# Patient Record
Sex: MALE | Race: BLACK OR AFRICAN AMERICAN | NOT HISPANIC OR LATINO
[De-identification: names, ages, dates, MRNs, and addresses within clinical notes are randomized per-mention and may not be internally consistent; named-entity substitution may affect disease eponyms.]

---

## 2020-08-20 PROBLEM — Z00.00 ENCOUNTER FOR PREVENTIVE HEALTH EXAMINATION: Status: ACTIVE | Noted: 2020-08-20

## 2020-08-25 ENCOUNTER — APPOINTMENT (OUTPATIENT)
Dept: NEUROLOGY | Facility: CLINIC | Age: 65
End: 2020-08-25

## 2020-09-02 ENCOUNTER — RESULT CHARGE (OUTPATIENT)
Age: 65
End: 2020-09-02

## 2020-09-03 ENCOUNTER — APPOINTMENT (OUTPATIENT)
Dept: NEUROLOGY | Facility: CLINIC | Age: 65
End: 2020-09-03
Payer: MEDICARE

## 2020-09-03 VITALS
HEART RATE: 83 BPM | WEIGHT: 201 LBS | BODY MASS INDEX: 28.14 KG/M2 | SYSTOLIC BLOOD PRESSURE: 112 MMHG | DIASTOLIC BLOOD PRESSURE: 74 MMHG | HEIGHT: 71 IN | OXYGEN SATURATION: 96 % | TEMPERATURE: 97.3 F

## 2020-09-03 DIAGNOSIS — R47.1 DYSARTHRIA AND ANARTHRIA: ICD-10-CM

## 2020-09-03 DIAGNOSIS — F17.200 NICOTINE DEPENDENCE, UNSPECIFIED, UNCOMPLICATED: ICD-10-CM

## 2020-09-03 DIAGNOSIS — Z86.79 PERSONAL HISTORY OF OTHER DISEASES OF THE CIRCULATORY SYSTEM: ICD-10-CM

## 2020-09-03 DIAGNOSIS — R26.81 UNSTEADINESS ON FEET: ICD-10-CM

## 2020-09-03 DIAGNOSIS — Z63.5 DISRUPTION OF FAMILY BY SEPARATION AND DIVORCE: ICD-10-CM

## 2020-09-03 DIAGNOSIS — Z78.9 OTHER SPECIFIED HEALTH STATUS: ICD-10-CM

## 2020-09-03 DIAGNOSIS — I63.9 CEREBRAL INFARCTION, UNSPECIFIED: ICD-10-CM

## 2020-09-03 DIAGNOSIS — Z87.39 PERSONAL HISTORY OF OTHER DISEASES OF THE MUSCULOSKELETAL SYSTEM AND CONNECTIVE TISSUE: ICD-10-CM

## 2020-09-03 DIAGNOSIS — Z86.39 PERSONAL HISTORY OF OTHER ENDOCRINE, NUTRITIONAL AND METABOLIC DISEASE: ICD-10-CM

## 2020-09-03 PROCEDURE — 99205 OFFICE O/P NEW HI 60 MIN: CPT

## 2020-09-03 RX ORDER — ATORVASTATIN CALCIUM 10 MG/1
10 TABLET, FILM COATED ORAL
Refills: 0 | Status: ACTIVE | COMMUNITY

## 2020-09-03 RX ORDER — ASPIRIN 81 MG/1
81 TABLET, COATED ORAL DAILY
Qty: 90 | Refills: 1 | Status: ACTIVE | COMMUNITY
Start: 2020-09-03 | End: 1900-01-01

## 2020-09-03 RX ORDER — LOSARTAN POTASSIUM 25 MG/1
25 TABLET, FILM COATED ORAL DAILY
Refills: 0 | Status: ACTIVE | COMMUNITY

## 2020-09-03 SDOH — SOCIAL STABILITY - SOCIAL INSECURITY: DISRUPTION OF FAMILY BY SEPARATION AND DIVORCE: Z63.5

## 2020-09-03 NOTE — REASON FOR VISIT
[Initial Eval - Existing Diagnosis] : an initial evaluation of an existing diagnosis [FreeTextEntry1] : slurred speech for 3 months as referred by his PCP Anastasiia Lopez

## 2020-09-03 NOTE — CONSULT LETTER
[Dear  ___] : Dear ~KISHORE, [Courtesy Letter:] : I had the pleasure of seeing your patient, [unfilled], in my office today. [Consult Closing:] : Thank you very much for allowing me to participate in the care of this patient.  If you have any questions, please do not hesitate to contact me. [Please see my note below.] : Please see my note below. [Sincerely,] : Sincerely, [FreeTextEntry3] : Dr. Taryn Ness MD\par Department of Neurology\par [facility] \par

## 2020-09-03 NOTE — PHYSICAL EXAM
[FreeTextEntry1] : Constitutional: alert, in no acute distress, well nourished and well developed.\par Psychiatric:  and insight and judgment were intact.\par \par Neurologic: \par Mental Status: The patient is alert, attentive, and oriented to person, place, and time. Dysarthria- speech\par Cranial nerves:\par CN II: Visual fields are full to confrontation. Visual acuity is intact. \par CN III, IV, VI: EOMI, PERRLA  ++nystagmus horizontally \par CN V: Facial sensation is intact to pinprick in all 3 divisions bilaterally. Corneal responses are intact.\par CN VII: Face is symmetric with normal eye closure and smile.\par CN VIII: Hearing is normal to rubbing fingers\par CN IX, X: Palate elevates symmetrically. Phonation is normal.\par CN XI: Head turning and shoulder shrug are intact and symmetric.\par CN XII: Tongue is midline with normal movements and no atrophy and no deviation with protrusion.\par Motor: There is no pronator drift of out-stretched arms. Muscle bulk and tone is normal. Strength is full bilaterally 5/5 on both UE and both LE. \par Sensation: light touch is intact\par Reflexes:Reflexes are 2+ and symmetric at the biceps, triceps, knees, and ankles.\par Coordination: Rapid alternating movements and fine finger movements are intact. There is no dysmetria on finger-to-nose and heel-knee-shin. There are no abnormal or extraneous movements. Negative Romberg. \par Gait/Stance: Posture is normal. Gait is slightly unsteady. Heel and toe - unsteady. When ambulating with eyes closed, veers to the left. \par \par Eyes: sclera and conjunctiva were normal.\par \par

## 2020-09-03 NOTE — DATA REVIEWED
[de-identified] : EXAM:  MRI BRAIN WITHOUT CONTRAST\par \par HISTORY:  Stuttering with speech problems \par \par TECHNIQUE:  MRI of the brain was performed utilizing multiplanar sequences.\par \par COMPARISON:  There are no prior studies available for comparison.\par \par FINDINGS: \par \par The ventricles and sulci are normal in size and configuration. There is no acute intracranial hemorrhage, extra-axial fluid collection, acute infarct or mass lesion.\par \par There are chronic lacunar infarcts involving the left centrum semiovale ovale, bilateral corona radiata, bilateral basal ganglia, right thalamus, left para midline esther and left cerebellar hemisphere. There are confluent periventricular, subcortical and deep white matter as well as pontine and cerebellar foci of hyperintense FLAIR signal as well as abnormal hyperintense FLAIR signal involving the genu, body and splenium of the corpus callosum.\par \par There are foci of hypointense gradient echo signal scattered throughout the cerebral hemispheres as well as involving the right thalamus, left basal ganglia and cerebellar hemispheres. Note is made of a cluster of small foci of hypointense gradient echo signal involving the left occipital lobe associated with a region of deep and subcortical white matter hyperintense FLAIR signal.\par \par The arterial flow voids at the skull base are unremarkable. The pituitary gland is not enlarged. The cerebellar tonsils are normal in position. There is mild paranasal sinus mucosal thickening. There is a nasal septal perforation measuring 0.8 cm in maximal cc diameter and 1.3 cm in maximal AP diameter.\par \par IMPRESSION:\par \par Confluent periventricular, subcortical and deep white matter foci of hyperintense FLAIR signal as well as abnormal hyperintense FLAIR signal involving the genu, body and splenium of the corpus callosum. These findings can be seen in the setting of demyelinating disease however given the presence of multiple chronic lacunar infarcts, these findings could also represent extensive chronic microvascular ischemic disease.\par \par Foci of hypointense gradient echo signal scattered throughout the cerebral hemispheres as well as involving the right thalamus, left basal ganglia and cerebellar hemispheres, including a cluster involving the left occipital lobe associated with a region of deep and subcortical white matter hyperintense FLAIR signal; these findings presumably represent chronic microhemorrhages, perhaps in the setting of hypertension. Postcontrast T1-weighted images are recommended for further evaluation of the abnormal hyperintense occipital lobe white matter signal as, although this finding likely represents extension of the chronic white matter changes, a region of vasogenic edema cannot be excluded.\par \par Mild paranasal sinus mucosal thickening with a nasal septal perforation.\par \par \par \par \par  \par \par \par  \par Thank you for the opportunity to participate in the care of this patient.  \par \par \par  \par  \par

## 2020-09-03 NOTE — HISTORY OF PRESENT ILLNESS
[FreeTextEntry1] : 65 year old right handed male patient w/pmh of HLD, HTN, and chronic back pain (2 years ago) presents for initial consultation for slurred speech for 3 months. Patient was referred by his PCP Anastasiia Lopez at [facility]. \par \par [place] . He noticed slurring then-difficulty searching words and not as fluent. \par \par Patient was recently diagnosed with HTN about 3 weeks ago and was started on losartan 25 mg. Patient does not have a BP machine at home. Patient reports he had blood work done on .\par \par Patient had MRI brain done last month- patient says there was a lot of ischemic changes. \par \par Exercise: has been doing PT 6-7 sessions for balance and lower back pain. Patient does exercises at home. \par Diet: maintains but eats whatever. \par \par Denies weakness, numbness, tingling, change in memory, blurry vision, headaches, or other neurological deficits.  \par \par Patient also has history of tobacco use- 4 years on and off; less than a pack. Was given Chantix prescription to stop smoking. \par \par No Family History of MIs and strokes. \par Mother-  of dementia \par

## 2020-09-03 NOTE — ASSESSMENT
[FreeTextEntry1] : 65 year old right handed male patient w/pmh of HLD, HTN, and chronic back pain (2 years ago) presents for initial consultation for slurred speech for 3 months. Based on imaging results, patient had multiple chronic strokes may be due to uncontrolled HTN in the past.\par \par Plan for Stroke Factors\par -Carotid Doppler ultrasound: discussed with patient the importance and need for ultrasound; if normal, repeat in a year. If abnormal, will notify patient. Repeat in 6 months. \par -Start  antiplatelet therapy: aspirin 81 mg daily. Monitor for any blood in urine or stool.\par -Start on Atorvastatin 40mg; LDL goal to be <70. Monitor for any muscle cramps/pain. Reassess lipid profile after 3 months- defer to PCP. \par -BP is well controlled; BP goal <130/80. Counseled on daily adherence to medications; defer to PCP if needed. Recommended patient to purchase BP monitor and keep a log. \par -Counseled on diet and exercise.\par -Smoking cessation counseling provided- defer to PCP. \par -Speech therapy referral for dysarthria\par -A1c- control; defer to PCP\par -Have labs faxed over from PCP \par \par *May assess memory at next visit- MOCA test \par \par f/u in 2 months or sooner after speech therapy\par \par

## 2020-09-23 ENCOUNTER — APPOINTMENT (OUTPATIENT)
Dept: NEUROLOGY | Facility: CLINIC | Age: 65
End: 2020-09-23
Payer: MEDICARE

## 2020-09-23 PROCEDURE — 93880 EXTRACRANIAL BILAT STUDY: CPT

## 2020-10-08 ENCOUNTER — APPOINTMENT (OUTPATIENT)
Dept: OTOLARYNGOLOGY | Facility: CLINIC | Age: 65
End: 2020-10-08
Payer: MEDICARE

## 2020-10-08 PROCEDURE — 92522 EVALUATE SPEECH PRODUCTION: CPT | Mod: GN

## 2020-11-02 ENCOUNTER — APPOINTMENT (OUTPATIENT)
Dept: NEUROLOGY | Facility: CLINIC | Age: 65
End: 2020-11-02

## 2021-04-21 ENCOUNTER — EMERGENCY (EMERGENCY)
Facility: HOSPITAL | Age: 66
LOS: 1 days | Discharge: HOME | End: 2021-04-21
Attending: EMERGENCY MEDICINE | Admitting: EMERGENCY MEDICINE
Payer: COMMERCIAL

## 2021-04-21 VITALS
SYSTOLIC BLOOD PRESSURE: 124 MMHG | HEART RATE: 89 BPM | RESPIRATION RATE: 18 BRPM | DIASTOLIC BLOOD PRESSURE: 89 MMHG | TEMPERATURE: 98 F | OXYGEN SATURATION: 95 %

## 2021-04-21 VITALS
SYSTOLIC BLOOD PRESSURE: 109 MMHG | DIASTOLIC BLOOD PRESSURE: 75 MMHG | TEMPERATURE: 98 F | WEIGHT: 190.04 LBS | HEIGHT: 70 IN | HEART RATE: 71 BPM | RESPIRATION RATE: 18 BRPM | OXYGEN SATURATION: 98 %

## 2021-04-21 DIAGNOSIS — I12.9 HYPERTENSIVE CHRONIC KIDNEY DISEASE WITH STAGE 1 THROUGH STAGE 4 CHRONIC KIDNEY DISEASE, OR UNSPECIFIED CHRONIC KIDNEY DISEASE: ICD-10-CM

## 2021-04-21 DIAGNOSIS — R55 SYNCOPE AND COLLAPSE: ICD-10-CM

## 2021-04-21 DIAGNOSIS — N18.9 CHRONIC KIDNEY DISEASE, UNSPECIFIED: ICD-10-CM

## 2021-04-21 DIAGNOSIS — R73.03 PREDIABETES: ICD-10-CM

## 2021-04-21 DIAGNOSIS — Z86.2 PERSONAL HISTORY OF DISEASES OF THE BLOOD AND BLOOD-FORMING ORGANS AND CERTAIN DISORDERS INVOLVING THE IMMUNE MECHANISM: ICD-10-CM

## 2021-04-21 LAB
ALBUMIN SERPL ELPH-MCNC: 4.5 G/DL — SIGNIFICANT CHANGE UP (ref 3.3–5)
ALP SERPL-CCNC: 100 U/L — SIGNIFICANT CHANGE UP (ref 40–120)
ALT FLD-CCNC: 40 U/L — SIGNIFICANT CHANGE UP (ref 10–45)
ANION GAP SERPL CALC-SCNC: 9 MMOL/L — SIGNIFICANT CHANGE UP (ref 5–17)
APTT BLD: 30.7 SEC — SIGNIFICANT CHANGE UP (ref 27.5–35.5)
AST SERPL-CCNC: 26 U/L — SIGNIFICANT CHANGE UP (ref 10–40)
BASOPHILS # BLD AUTO: 0.02 K/UL — SIGNIFICANT CHANGE UP (ref 0–0.2)
BASOPHILS NFR BLD AUTO: 0.4 % — SIGNIFICANT CHANGE UP (ref 0–2)
BILIRUB SERPL-MCNC: 0.4 MG/DL — SIGNIFICANT CHANGE UP (ref 0.2–1.2)
BUN SERPL-MCNC: 30 MG/DL — HIGH (ref 7–23)
CALCIUM SERPL-MCNC: 10.4 MG/DL — SIGNIFICANT CHANGE UP (ref 8.4–10.5)
CHLORIDE SERPL-SCNC: 108 MMOL/L — SIGNIFICANT CHANGE UP (ref 96–108)
CO2 SERPL-SCNC: 22 MMOL/L — SIGNIFICANT CHANGE UP (ref 22–31)
CREAT SERPL-MCNC: 2.21 MG/DL — HIGH (ref 0.5–1.3)
EOSINOPHIL # BLD AUTO: 0.05 K/UL — SIGNIFICANT CHANGE UP (ref 0–0.5)
EOSINOPHIL NFR BLD AUTO: 1 % — SIGNIFICANT CHANGE UP (ref 0–6)
GLUCOSE SERPL-MCNC: 109 MG/DL — HIGH (ref 70–99)
HCT VFR BLD CALC: 39.4 % — SIGNIFICANT CHANGE UP (ref 39–50)
HGB BLD-MCNC: 12.8 G/DL — LOW (ref 13–17)
IMM GRANULOCYTES NFR BLD AUTO: 0.2 % — SIGNIFICANT CHANGE UP (ref 0–1.5)
INR BLD: 1.02 — SIGNIFICANT CHANGE UP (ref 0.88–1.16)
LYMPHOCYTES # BLD AUTO: 1.39 K/UL — SIGNIFICANT CHANGE UP (ref 1–3.3)
LYMPHOCYTES # BLD AUTO: 27.2 % — SIGNIFICANT CHANGE UP (ref 13–44)
MCHC RBC-ENTMCNC: 28.2 PG — SIGNIFICANT CHANGE UP (ref 27–34)
MCHC RBC-ENTMCNC: 32.5 GM/DL — SIGNIFICANT CHANGE UP (ref 32–36)
MCV RBC AUTO: 86.8 FL — SIGNIFICANT CHANGE UP (ref 80–100)
MONOCYTES # BLD AUTO: 0.42 K/UL — SIGNIFICANT CHANGE UP (ref 0–0.9)
MONOCYTES NFR BLD AUTO: 8.2 % — SIGNIFICANT CHANGE UP (ref 2–14)
NEUTROPHILS # BLD AUTO: 3.22 K/UL — SIGNIFICANT CHANGE UP (ref 1.8–7.4)
NEUTROPHILS NFR BLD AUTO: 63 % — SIGNIFICANT CHANGE UP (ref 43–77)
NRBC # BLD: 0 /100 WBCS — SIGNIFICANT CHANGE UP (ref 0–0)
PLATELET # BLD AUTO: 231 K/UL — SIGNIFICANT CHANGE UP (ref 150–400)
POTASSIUM SERPL-MCNC: 3.6 MMOL/L — SIGNIFICANT CHANGE UP (ref 3.5–5.3)
POTASSIUM SERPL-SCNC: 3.6 MMOL/L — SIGNIFICANT CHANGE UP (ref 3.5–5.3)
PROT SERPL-MCNC: 7.5 G/DL — SIGNIFICANT CHANGE UP (ref 6–8.3)
PROTHROM AB SERPL-ACNC: 12.2 SEC — SIGNIFICANT CHANGE UP (ref 10.6–13.6)
RBC # BLD: 4.54 M/UL — SIGNIFICANT CHANGE UP (ref 4.2–5.8)
RBC # FLD: 15.9 % — HIGH (ref 10.3–14.5)
SODIUM SERPL-SCNC: 139 MMOL/L — SIGNIFICANT CHANGE UP (ref 135–145)
WBC # BLD: 5.11 K/UL — SIGNIFICANT CHANGE UP (ref 3.8–10.5)
WBC # FLD AUTO: 5.11 K/UL — SIGNIFICANT CHANGE UP (ref 3.8–10.5)

## 2021-04-21 PROCEDURE — 85730 THROMBOPLASTIN TIME PARTIAL: CPT

## 2021-04-21 PROCEDURE — 85025 COMPLETE CBC W/AUTO DIFF WBC: CPT

## 2021-04-21 PROCEDURE — 84484 ASSAY OF TROPONIN QUANT: CPT

## 2021-04-21 PROCEDURE — 93005 ELECTROCARDIOGRAM TRACING: CPT

## 2021-04-21 PROCEDURE — 36415 COLL VENOUS BLD VENIPUNCTURE: CPT

## 2021-04-21 PROCEDURE — 85610 PROTHROMBIN TIME: CPT

## 2021-04-21 PROCEDURE — 36000 PLACE NEEDLE IN VEIN: CPT

## 2021-04-21 PROCEDURE — 80053 COMPREHEN METABOLIC PANEL: CPT

## 2021-04-21 PROCEDURE — 99284 EMERGENCY DEPT VISIT MOD MDM: CPT

## 2021-04-21 RX ORDER — SODIUM CHLORIDE 9 MG/ML
500 INJECTION INTRAMUSCULAR; INTRAVENOUS; SUBCUTANEOUS ONCE
Refills: 0 | Status: COMPLETED | OUTPATIENT
Start: 2021-04-21 | End: 2021-04-21

## 2021-04-21 RX ADMIN — SODIUM CHLORIDE 1000 MILLILITER(S): 9 INJECTION INTRAMUSCULAR; INTRAVENOUS; SUBCUTANEOUS at 15:17

## 2021-04-21 NOTE — ED ADULT NURSE NOTE - NSFALLRSKPASTHIST_ED_ALL_ED
Likely secondary to a lrge right inguinal hernia with scrotal extension and not a primary urological problem. In light of this he does not need a Urology consult but does need a General Surgery consult for consideration for surgical repair of this right inguinal hernia. Likely secondary to a large right inguinal hernia with scrotal extension and not a primary urological problem. In light of this he does not need a Urology consult but does need a General Surgery consult for consideration for surgical repair of this right inguinal hernia. yes

## 2021-04-21 NOTE — ED PROVIDER NOTE - NSFOLLOWUPINSTRUCTIONS_ED_ALL_ED_FT
Can take tylenol 650mg every 6hrs as needed for mild pain.  Stay well hydrated.  Return for fevers, persistent vomit, uncontrolled pain, worsening breathing, worsening lightheaded.  Follow up with primary doctor within 1-2 days.   Follow up with cardiologist. Can call [phone] ([facility]) to schedule appointment.     Syncope    Syncope refers to a condition in which a person temporarily loses consciousness. Syncope may also be called fainting or passing out. It is caused by a sudden decrease in blood flow to the brain. Even though most causes of syncope are not dangerous, syncope can be a sign of a serious medical problem. Your health care provider may do tests to find the reason why you are having syncope.    Signs that you may be about to faint include:  Feeling dizzy or light-headed.  Feeling nauseous.  Seeing all white or all black in your field of vision.  Having cold, clammy skin.  If you faint, get medical help right away.   Call your local emergency services (911 in the [place] Do not drive yourself to the hospital.    Follow these instructions at home:  Pay attention to any changes in your symptoms. Take these actions to stay safe and to help relieve your symptoms:    Lifestyle     Do not drive, use machinery, or play sports until your health care provider says it is okay. Do not drink alcohol. Do not use any products that contain nicotine or tobacco, such as cigarettes and e-cigarettes. If you need help quitting, ask your health care provider. Drink enough fluid to keep your urine pale yellow.    General instructions     Take over-the-counter and prescription medicines only as told by your health care provider. If you are taking blood pressure or heart medicine, get up slowly and take several minutes to sit and then stand. This can reduce dizziness or light-headedness. Have someone stay with you until you feel stable. If you start to feel like you might faint, lie down right away and raise (elevate) your feet above the level of your heart. Breathe deeply and steadily. Wait until all the symptoms have passed. Keep all follow-up visits as told by your health care provider. This is important.   Get help right away if you:  Have a severe headache.  Faint once or repeatedly.  Have pain in your chest, abdomen, or back.  Have a very fast or irregular heartbeat (palpitations).  Have pain when you breathe.  Are bleeding from your mouth or rectum, or you have black or tarry stool.  Have a seizure.  Are confused.  Have trouble walking.  Have severe weakness.  Have vision problems.  These symptoms may represent a serious problem that is an emergency. Do not wait to see if your symptoms will go away. Get medical help right away. Call your local emergency services (911 in the [place] Do not drive yourself to the hospital.

## 2021-04-21 NOTE — ED ADULT NURSE NOTE - OBJECTIVE STATEMENT
Pt AOx4. Pt reports 1 syncopal episode yesterday while walking down the sidewalk. Pt denies hitting head. Pt denies LOC. Pt denies blood thinners. Pt states "all of the sudden I felt dizzy and I passed out". Pt denies fevers, chills, n/v, SOB, chest pain, changes in vision, numbness, tingling. Pt endorses right sided lower back pain. Pt denies pain or burning with urination. Pt speaking in full complete sentences. Respirations even and unlabored.

## 2021-04-21 NOTE — ED PROVIDER NOTE - CLINICAL SUMMARY MEDICAL DECISION MAKING FREE TEXT BOX
64 y/o M pt presents with  fall s/p syncopal episode 2 days ago, consider vasovagal versus other cause of syncope. Given slightly elevated trop and initial hypotension, will admit. 64 y/o M pt presents with  fall s/p syncopal episode 2 days ago, consider vasovagal versus other cause of syncope. Given slightly elevated trop and initial hypotension, will  for further cardiology eval.

## 2021-04-21 NOTE — ED PROVIDER NOTE - OBJECTIVE STATEMENT
64 y/o M pt with PMHx of anemia, pre-diabetes, CKD, and HTN presents to ED c/o syncopal episode 2 days ago. Pt states he was walking at the time when he suddenly felt dizzy and had LOC. Pt denies head injury, chest pain, shortness of breath, or any other acute complaints. He relates a similar episode for which he was seen at [facility] 2 years ago. 64 y/o M pt with PMHx of anemia, pre-diabetes, CKD, and HTN presents to ED c/o syncopal episode 2 days ago. Pt states he was walking at the time when he suddenly felt dizzy and had LOC. Pt denies head injury, chest pain, shortness of breath, or any other acute complaints. He relates a similar episode for which he was seen at [facility] 2 years ago. no known prior cardiac hx.

## 2021-04-21 NOTE — ED ADULT TRIAGE NOTE - OTHER COMPLAINTS
syncopal episode yesterday, went to UC noted to be orthostatic. pt also endorses right lower back pain, nonradiating. Right /75, Left BP 94/64. EKG in progress

## 2021-04-21 NOTE — ED PROVIDER NOTE - PATIENT PORTAL LINK FT
You can access the FollowMyHealth Patient Portal offered by [facility] by registering at the following website: [email or web address]. By joining [employer] [facility]’s FollowMyHealth portal, you will also be able to view your health information using other applications (apps) compatible with our system.

## 2021-04-21 NOTE — ED ADULT NURSE NOTE - EXPLANATION OF PATIENT'S REASON FOR LEAVING
pt states he did not want to wait any longer in ED. Dr. Fish explained to patient risk of leaving AMA, as did primary nurse ESMER Lutz and Benjy Ruff RN

## 2021-05-14 PROBLEM — R73.03 PREDIABETES: Chronic | Status: ACTIVE | Noted: 2021-04-26

## 2021-05-14 PROBLEM — D64.9 ANEMIA, UNSPECIFIED: Chronic | Status: ACTIVE | Noted: 2021-04-26

## 2021-05-14 PROBLEM — N18.9 CHRONIC KIDNEY DISEASE, UNSPECIFIED: Chronic | Status: ACTIVE | Noted: 2021-04-26

## 2021-05-14 PROBLEM — I10 ESSENTIAL (PRIMARY) HYPERTENSION: Chronic | Status: ACTIVE | Noted: 2021-04-26

## 2021-06-28 ENCOUNTER — APPOINTMENT (OUTPATIENT)
Dept: NEUROLOGY | Facility: CLINIC | Age: 66
End: 2021-06-28
Payer: MEDICARE

## 2021-06-28 VITALS
HEIGHT: 71 IN | RESPIRATION RATE: 16 BRPM | TEMPERATURE: 97.7 F | OXYGEN SATURATION: 94 % | SYSTOLIC BLOOD PRESSURE: 126 MMHG | BODY MASS INDEX: 28.14 KG/M2 | HEART RATE: 81 BPM | WEIGHT: 201 LBS | DIASTOLIC BLOOD PRESSURE: 92 MMHG

## 2021-06-28 DIAGNOSIS — M54.9 DORSALGIA, UNSPECIFIED: ICD-10-CM

## 2021-06-28 DIAGNOSIS — G89.29 DORSALGIA, UNSPECIFIED: ICD-10-CM

## 2021-06-28 PROCEDURE — 95819 EEG AWAKE AND ASLEEP: CPT

## 2021-06-28 PROCEDURE — 99072 ADDL SUPL MATRL&STAF TM PHE: CPT

## 2021-06-28 PROCEDURE — 99215 OFFICE O/P EST HI 40 MIN: CPT

## 2021-07-22 VITALS
WEIGHT: 179.9 LBS | HEIGHT: 71 IN | HEART RATE: 73 BPM | DIASTOLIC BLOOD PRESSURE: 79 MMHG | SYSTOLIC BLOOD PRESSURE: 128 MMHG | RESPIRATION RATE: 15 BRPM | TEMPERATURE: 98 F | OXYGEN SATURATION: 96 %

## 2021-07-22 RX ORDER — CHLORHEXIDINE GLUCONATE 213 G/1000ML
1 SOLUTION TOPICAL ONCE
Refills: 0 | Status: DISCONTINUED | OUTPATIENT
Start: 2021-07-27 | End: 2021-08-10

## 2021-07-22 NOTE — H&P ADULT - NSICDXPASTMEDICALHX_GEN_ALL_CORE_FT
PAST MEDICAL HISTORY:  Anemia     CKD (chronic kidney disease)     HTN (hypertension)     Pre-diabetes

## 2021-07-22 NOTE — H&P ADULT - RS GEN PE MLT RESP DETAILS PC
normal/airway patent/breath sounds equal/good air movement/respirations non-labored/no rhonchi/no wheezes

## 2021-07-22 NOTE — H&P ADULT - NSHPSOCIALHISTORY_GEN_ALL_CORE
current smoker current 1 pack a week smoker x 40 years  denies former or current alcohol or illicit drug use

## 2021-07-22 NOTE — H&P ADULT - ASSESSMENT
64 y/o M, current smoker/Poor historian, with a PMH of HTN, HLD, CKD (Cr 2.2 from 4/2021), chronic back pain who presented to cardiologist Dr. Ty after syncopal episode. Patient was seen in the ER 4/2021 after he felt weak/dizzy while standing in the store then passed out. He was found to have trop 0.02, ECG non-ischemic and was recommended to be admitted for syncope workup however signed out AMA. He was later seen at Dr. Ty's office where ECG showed sinus bradycardia 1 st degree av block TWI in anterolateral leads. Echo from 7/15/251 moderate LVH, EF 60%, grade I diastolic dysfunction, no WMA. He was placed on a Holter monitor which he just finished x3 days ago and hasn't received any results yet. In light of patient's risk factors, recent syncopal episode patient is referred for cardiac catheterization with possible intervention if clinically indicated.     -H/H = 12.3/39.7. Pt denies BRBPR, hematuria, hematochezia, melena. Pt took home 81mg ASA today AM and given additional 600mg Plavix x1 pre cath  -Cr = 1.50. EF normal. Euvolemic on exam. IVF @ 100cc/hr started pre procedure  -Type of sedation: moderate  -Candidate for sedation: yes     Risks & benefits of procedure and alternative therapy have been explained to the patient including but not limited to: allergic reaction, bleeding w/possible need for blood transfusion, infection, renal and vascular compromise, limb damage, arrhythmia, stroke, vessel dissection/perforation, Myocardial infarction, emergent CABG. Informed consent obtained and in chart.

## 2021-07-22 NOTE — H&P ADULT - HISTORY OF PRESENT ILLNESS
Cardiologist: Dr. Ty  COVID:  Pharmacy:  Escort:    [facility]-- schedule RNs aware patient needs to come early for hydration     Pt is a 64 y/o male current smoker with HTN, HLD, CKD ? Cr 2.2 from 4/2021 Who presented to cardiologist Dr. yT after syncopal episode. Patient welt weak while standing in the store then passed out and was seen at [facility], he was suggested for admission but left AMA    Patient denies active chest pain, palpitations, shortness of breath, diaphoresis, fatigue, dizziness, syncope, lower extremity edema, orthopnea, positional nocturnal dyspnea, recent fever, chills, night sweats, cough, nausea, vomiting, and diarrhea.    EKG in office sinus bradycardia 1 st degree av block TWI in anterolateral leads. Echo from 7/15/251 moderate LVH, EF 60%, grade I diastolic dysfunction, no WMA.     In light of patients risk factors, CCS class **, and abnormal patient now presents for cardiac catheterization with possible intervention.    Cardiologist: Dr. Ty  COVID:   Pharmacy:  Escort:    [facility]LE[facility]:  LM, NO ANS-- schedule RNs aware patient needs to come early for hydration     Pt is a 66 y/o male current smoker with HTN, HLD, CKD ? Cr 2.2 from 4/2021 Who presented to cardiologist Dr. Ty after syncopal episode. Patient welt weak while standing in the store then passed out and was seen at [facility], he was suggested for admission but left AMA    Patient denies active chest pain, palpitations, shortness of breath, diaphoresis, fatigue, dizziness, syncope, lower extremity edema, orthopnea, positional nocturnal dyspnea, recent fever, chills, night sweats, cough, nausea, vomiting, and diarrhea.    EKG in office sinus bradycardia 1 st degree av block TWI in anterolateral leads. Echo from 7/15/251 moderate LVH, EF 60%, grade I diastolic dysfunction, no WMA.     In light of patients risk factors, CCS class **, and abnormal patient now presents for cardiac catheterization with possible intervention.    Cardiologist: Dr. Ty  COVID:   Pharmacy:  Escort:    [facility]:  LM 7/23 and 7/26-- schedule RNs aware patient needs to come early for hydration     Pt is a 66 y/o male current smoker with HTN, HLD, CKD ? Cr 2.2 from 4/2021 Who presented to cardiologist Dr. Ty after syncopal episode. Patient welt weak while standing in the store then passed out and was seen at [facility], he was suggested for admission but left AMA    Patient denies active chest pain, palpitations, shortness of breath, diaphoresis, fatigue, dizziness, syncope, lower extremity edema, orthopnea, positional nocturnal dyspnea, recent fever, chills, night sweats, cough, nausea, vomiting, and diarrhea.    EKG in office sinus bradycardia 1 st degree av block TWI in anterolateral leads. Echo from 7/15/251 moderate LVH, EF 60%, grade I diastolic dysfunction, no WMA.     In light of patients risk factors, CCS class **, and abnormal patient now presents for cardiac catheterization with possible intervention.    Cardiologist: Dr. Ty  COVID: Vaccinated w/ Moderna x2 (last dose 2/2021) - lost card and got swab (negative) 7/27/21  Ph[facility]: [facility] [address] [place] [address]  Escort: Friend    64 y/o M, current smoker/Poor historian, with a PMH of HTN, HLD, CKD (Cr 2.2 from 4/2021), chronic back pain who presented to cardiologist Dr. Ty after syncopal episode. Patient was seen in the ER 4/2021 after he felt weak/dizzy while standing in the store then passed out. He was found to have trop 0.02, ECG non-ischemic and was recommended to be admitted for syncope workup however signed out AMA. He was later seen at Dr. Ty's office where ECG showed sinus bradycardia 1 st degree av block TWI in anterolateral leads. Echo from 7/15/251 moderate LVH, EF 60%, grade I diastolic dysfunction, no WMA. Patient denied active chest pain, palpitations, shortness of breath, diaphoresis, fatigue, dizziness, lower extremity edema, orthopnea, positional nocturnal dyspnea, recent fever, chills, night sweats, cough, nausea, vomiting, and diarrhea. He was placed on a Holter monitor which he just finished x3 days ago and hasn't received any results yet. In light of patient's risk factors, recent syncopal episode patient is referred for cardiac catheterization with possible intervention if clinically indicated.

## 2021-07-27 ENCOUNTER — OUTPATIENT (OUTPATIENT)
Dept: OUTPATIENT SERVICES | Facility: HOSPITAL | Age: 66
LOS: 1 days | Discharge: HOME | End: 2021-07-27
Payer: MEDICARE

## 2021-07-27 DIAGNOSIS — Z98.890 OTHER SPECIFIED POSTPROCEDURAL STATES: Chronic | ICD-10-CM

## 2021-07-27 LAB
A1C WITH ESTIMATED AVERAGE GLUCOSE RESULT: 5.6 % — SIGNIFICANT CHANGE UP (ref 4–5.6)
ALBUMIN SERPL ELPH-MCNC: 5.1 G/DL — HIGH (ref 3.3–5)
ALP SERPL-CCNC: 88 U/L — SIGNIFICANT CHANGE UP (ref 40–120)
ALT FLD-CCNC: 68 U/L — HIGH (ref 10–45)
ANION GAP SERPL CALC-SCNC: 12 MMOL/L — SIGNIFICANT CHANGE UP (ref 5–17)
APTT BLD: 35 SEC — SIGNIFICANT CHANGE UP (ref 27.5–35.5)
AST SERPL-CCNC: 36 U/L — SIGNIFICANT CHANGE UP (ref 10–40)
BASOPHILS # BLD AUTO: 0.03 K/UL — SIGNIFICANT CHANGE UP (ref 0–0.2)
BASOPHILS NFR BLD AUTO: 0.7 % — SIGNIFICANT CHANGE UP (ref 0–2)
BILIRUB SERPL-MCNC: 0.4 MG/DL — SIGNIFICANT CHANGE UP (ref 0.2–1.2)
BUN SERPL-MCNC: 16 MG/DL — SIGNIFICANT CHANGE UP (ref 7–23)
CALCIUM SERPL-MCNC: 9.8 MG/DL — SIGNIFICANT CHANGE UP (ref 8.4–10.5)
CHLORIDE SERPL-SCNC: 102 MMOL/L — SIGNIFICANT CHANGE UP (ref 96–108)
CHOLEST SERPL-MCNC: 277 MG/DL — HIGH
CK MB CFR SERPL CALC: 4.2 NG/ML — SIGNIFICANT CHANGE UP (ref 0–6.7)
CK SERPL-CCNC: 369 U/L — HIGH (ref 30–200)
CO2 SERPL-SCNC: 25 MMOL/L — SIGNIFICANT CHANGE UP (ref 22–31)
CREAT SERPL-MCNC: 1.5 MG/DL — HIGH (ref 0.5–1.3)
EOSINOPHIL # BLD AUTO: 0.08 K/UL — SIGNIFICANT CHANGE UP (ref 0–0.5)
EOSINOPHIL NFR BLD AUTO: 1.9 % — SIGNIFICANT CHANGE UP (ref 0–6)
ESTIMATED AVERAGE GLUCOSE: 114 MG/DL — SIGNIFICANT CHANGE UP (ref 68–114)
GLUCOSE SERPL-MCNC: 107 MG/DL — HIGH (ref 70–99)
HCT VFR BLD CALC: 39.7 % — SIGNIFICANT CHANGE UP (ref 39–50)
HDLC SERPL-MCNC: 36 MG/DL — LOW
HGB BLD-MCNC: 12.3 G/DL — LOW (ref 13–17)
IMM GRANULOCYTES NFR BLD AUTO: 0 % — SIGNIFICANT CHANGE UP (ref 0–1.5)
INR BLD: 0.97 — SIGNIFICANT CHANGE UP (ref 0.88–1.16)
LIPID PNL WITH DIRECT LDL SERPL: 153 MG/DL — HIGH
LYMPHOCYTES # BLD AUTO: 1.57 K/UL — SIGNIFICANT CHANGE UP (ref 1–3.3)
LYMPHOCYTES # BLD AUTO: 37.8 % — SIGNIFICANT CHANGE UP (ref 13–44)
MCHC RBC-ENTMCNC: 27.8 PG — SIGNIFICANT CHANGE UP (ref 27–34)
MCHC RBC-ENTMCNC: 31 GM/DL — LOW (ref 32–36)
MCV RBC AUTO: 89.6 FL — SIGNIFICANT CHANGE UP (ref 80–100)
MONOCYTES # BLD AUTO: 0.4 K/UL — SIGNIFICANT CHANGE UP (ref 0–0.9)
MONOCYTES NFR BLD AUTO: 9.6 % — SIGNIFICANT CHANGE UP (ref 2–14)
NEUTROPHILS # BLD AUTO: 2.07 K/UL — SIGNIFICANT CHANGE UP (ref 1.8–7.4)
NEUTROPHILS NFR BLD AUTO: 50 % — SIGNIFICANT CHANGE UP (ref 43–77)
NON HDL CHOLESTEROL: 241 MG/DL — HIGH
NRBC # BLD: 0 /100 WBCS — SIGNIFICANT CHANGE UP (ref 0–0)
PLATELET # BLD AUTO: 192 K/UL — SIGNIFICANT CHANGE UP (ref 150–400)
POTASSIUM SERPL-MCNC: 3.9 MMOL/L — SIGNIFICANT CHANGE UP (ref 3.5–5.3)
POTASSIUM SERPL-SCNC: 3.9 MMOL/L — SIGNIFICANT CHANGE UP (ref 3.5–5.3)
PROT SERPL-MCNC: 7.8 G/DL — SIGNIFICANT CHANGE UP (ref 6–8.3)
PROTHROM AB SERPL-ACNC: 11.6 SEC — SIGNIFICANT CHANGE UP (ref 10.6–13.6)
RBC # BLD: 4.43 M/UL — SIGNIFICANT CHANGE UP (ref 4.2–5.8)
RBC # FLD: 13.7 % — SIGNIFICANT CHANGE UP (ref 10.3–14.5)
SODIUM SERPL-SCNC: 139 MMOL/L — SIGNIFICANT CHANGE UP (ref 135–145)
TRIGL SERPL-MCNC: 440 MG/DL — HIGH
WBC # BLD: 4.15 K/UL — SIGNIFICANT CHANGE UP (ref 3.8–10.5)
WBC # FLD AUTO: 4.15 K/UL — SIGNIFICANT CHANGE UP (ref 3.8–10.5)

## 2021-07-27 PROCEDURE — C1894: CPT

## 2021-07-27 PROCEDURE — C1769: CPT

## 2021-07-27 PROCEDURE — 85730 THROMBOPLASTIN TIME PARTIAL: CPT

## 2021-07-27 PROCEDURE — 83036 HEMOGLOBIN GLYCOSYLATED A1C: CPT

## 2021-07-27 PROCEDURE — 80061 LIPID PANEL: CPT

## 2021-07-27 PROCEDURE — 93458 L HRT ARTERY/VENTRICLE ANGIO: CPT

## 2021-07-27 PROCEDURE — 93005 ELECTROCARDIOGRAM TRACING: CPT

## 2021-07-27 PROCEDURE — 80053 COMPREHEN METABOLIC PANEL: CPT

## 2021-07-27 PROCEDURE — 93010 ELECTROCARDIOGRAM REPORT: CPT

## 2021-07-27 PROCEDURE — C1887: CPT

## 2021-07-27 PROCEDURE — 85610 PROTHROMBIN TIME: CPT

## 2021-07-27 PROCEDURE — 36415 COLL VENOUS BLD VENIPUNCTURE: CPT

## 2021-07-27 PROCEDURE — 93458 L HRT ARTERY/VENTRICLE ANGIO: CPT | Mod: 26

## 2021-07-27 PROCEDURE — 85025 COMPLETE CBC W/AUTO DIFF WBC: CPT

## 2021-07-27 PROCEDURE — 82550 ASSAY OF CK (CPK): CPT

## 2021-07-27 PROCEDURE — 82553 CREATINE MB FRACTION: CPT

## 2021-07-27 RX ORDER — CHLORTHALIDONE 50 MG
1 TABLET ORAL
Qty: 0 | Refills: 0 | DISCHARGE

## 2021-07-27 RX ORDER — CLOPIDOGREL BISULFATE 75 MG/1
600 TABLET, FILM COATED ORAL ONCE
Refills: 0 | Status: COMPLETED | OUTPATIENT
Start: 2021-07-27 | End: 2021-07-27

## 2021-07-27 RX ORDER — SODIUM CHLORIDE 9 MG/ML
500 INJECTION INTRAMUSCULAR; INTRAVENOUS; SUBCUTANEOUS
Refills: 0 | Status: DISCONTINUED | OUTPATIENT
Start: 2021-07-27 | End: 2021-07-27

## 2021-07-27 RX ORDER — ACETAMINOPHEN 500 MG
2 TABLET ORAL
Qty: 0 | Refills: 0 | DISCHARGE

## 2021-07-27 RX ORDER — SODIUM CHLORIDE 9 MG/ML
500 INJECTION INTRAMUSCULAR; INTRAVENOUS; SUBCUTANEOUS
Refills: 0 | Status: DISCONTINUED | OUTPATIENT
Start: 2021-07-27 | End: 2021-08-10

## 2021-07-27 RX ORDER — ATORVASTATIN CALCIUM 80 MG/1
1 TABLET, FILM COATED ORAL
Qty: 0 | Refills: 0 | DISCHARGE

## 2021-07-27 RX ORDER — AMLODIPINE BESYLATE 2.5 MG/1
1 TABLET ORAL
Qty: 0 | Refills: 0 | DISCHARGE

## 2021-07-27 RX ORDER — ASPIRIN/CALCIUM CARB/MAGNESIUM 324 MG
1 TABLET ORAL
Qty: 0 | Refills: 0 | DISCHARGE

## 2021-07-27 RX ADMIN — SODIUM CHLORIDE 100 MILLILITER(S): 9 INJECTION INTRAMUSCULAR; INTRAVENOUS; SUBCUTANEOUS at 11:01

## 2021-07-27 RX ADMIN — CLOPIDOGREL BISULFATE 600 MILLIGRAM(S): 75 TABLET, FILM COATED ORAL at 11:11

## 2021-07-27 NOTE — PROGRESS NOTE ADULT - SUBJECTIVE AND OBJECTIVE BOX
Interventional Cardiology [facility] Discharge Note    Patient without complaints.     Afebrile, VSS    Ext:    		  		        Right/Left              Radial :    hematoma,     bleeding, dressing; C/D/I      Pulses:    intact RAD to baseline     A/P:      64 y/o M, current smoker/Poor historian, with a PMH of HTN, HLD, CKD (Cr 2.2 from 4/2021), chronic back pain who presented to cardiologist Dr. Ty after syncopal episode. Patient was seen in the ER 4/2021 after he felt weak/dizzy while standing in the store then passed out. He was found to have trop 0.02, ECG non-ischemic and was recommended to be admitted for syncope workup however signed out AMA. He was later seen at Dr. Ty's office where ECG showed sinus bradycardia 1 st degree av block TWI in anterolateral leads. Echo from 7/15/251 moderate LVH, EF 60%, grade I diastolic dysfunction, no WMA. Patient denied active chest pain, palpitations, shortness of breath, diaphoresis, fatigue, dizziness, lower extremity edema, orthopnea, positional nocturnal dyspnea, recent fever, chills, night sweats, cough, nausea, vomiting, and diarrhea. He was placed on a Holter monitor which he just finished x3 days ago and hasn't received any results yet. In light of patient's risk factors, recent syncopal episode patient is referred for cardiac catheterization with possible intervention if clinically indicated.     s/p diagnostic cath 7/27/21:      1.	Stable for discharge as per attending Dr. Zazueta after bed rest, pt voids, groin/wrist stable and 30 minutes of ambulation.  2.	Follow-up with Cardiologist Dr. Ty in 1-2 weeks  3.	Discharged forms signed and copies in chart    Interventional Cardiology [facility] Discharge Note    Patient without complaints.     Afebrile, VSS    Ext:    		  		        Right/Left              Radial :    hematoma,     bleeding, dressing; C/D/I      Pulses:    intact RAD to baseline     A/P:      66 y/o M, current smoker/Poor historian, with a PMH of HTN, HLD, CKD (Cr 2.2 from 4/2021), chronic back pain who presented to cardiologist Dr. Ty after syncopal episode. Patient was seen in the ER 4/2021 after he felt weak/dizzy while standing in the store then passed out. He was found to have trop 0.02, ECG non-ischemic and was recommended to be admitted for syncope workup however signed out AMA. He was later seen at Dr. Ty's office where ECG showed sinus bradycardia 1 st degree av block TWI in anterolateral leads. Echo from 7/15/251 moderate LVH, EF 60%, grade I diastolic dysfunction, no WMA. Patient denied active chest pain, palpitations, shortness of breath, diaphoresis, fatigue, dizziness, lower extremity edema, orthopnea, positional nocturnal dyspnea, recent fever, chills, night sweats, cough, nausea, vomiting, and diarrhea. He was placed on a Holter monitor which he just finished x3 days ago and hasn't received any results yet. In light of patient's risk factors, recent syncopal episode patient is referred for cardiac catheterization with possible intervention if clinically indicated.     s/p diagnostic cath 7/27/21: LMCA normal, LCx dominant LI, RCA small non-dom with high ant take off and mild LI, LVEF 65%, LVEDP 17 mmHg, R radial. Plan for medical management.     1.	Stable for discharge as per attending Dr. Zazueta after bed rest, pt voids, groin/wrist stable and 30 minutes of ambulation.  2.	Follow-up with Cardiologist Dr. Ty in 1-2 weeks  3.	Discharged forms signed and copies in chart    Interventional Cardiology [facility] Discharge Note    Patient without complaints.     Afebrile, VSS    Ext:    		  		        Right              Radial :    hematoma,     bleeding, dressing; C/D/I      Pulses:    intact RAD to baseline     A/P:      64 y/o M, current smoker/Poor historian, with a PMH of HTN, HLD, CKD (Cr 2.2 from 4/2021), chronic back pain who presented to cardiologist Dr. Ty after syncopal episode. Patient was seen in the ER 4/2021 after he felt weak/dizzy while standing in the store then passed out. He was found to have trop 0.02, ECG non-ischemic and was recommended to be admitted for syncope workup however signed out AMA. He was later seen at Dr. Ty's office where ECG showed sinus bradycardia 1 st degree av block TWI in anterolateral leads. Echo from 7/15/251 moderate LVH, EF 60%, grade I diastolic dysfunction, no WMA. Patient denied active chest pain, palpitations, shortness of breath, diaphoresis, fatigue, dizziness, lower extremity edema, orthopnea, positional nocturnal dyspnea, recent fever, chills, night sweats, cough, nausea, vomiting, and diarrhea. He was placed on a Holter monitor which he just finished x3 days ago and hasn't received any results yet. In light of patient's risk factors, recent syncopal episode patient is referred for cardiac catheterization with possible intervention if clinically indicated.     s/p diagnostic cath 7/27/21: LMCA normal, LCx dominant LI, RCA small non-dom with high ant take off and mild LI, LVEF 65%, LVEDP 17 mmHg, R radial. Plan for medical management.     1.	Stable for discharge as per attending Dr. Zazueta after bed rest, pt voids, groin/wrist stable and 30 minutes of ambulation.  2.	Follow-up with Cardiologist Dr. Ty in 1-2 weeks  3.	Discharged forms signed and copies in chart    Interventional Cardiology [facility] Discharge Note    Patient without complaints.     Afebrile, VSS    Ext:    		  		        Right              Radial :    hematoma,     bleeding, dressing; C/D/I      Pulses:    intact RAD to baseline     A/P:      66 y/o M, current smoker/Poor historian, with a PMH of HTN, HLD, CKD (Cr 2.2 from 4/2021), chronic back pain who presented to cardiologist Dr. Ty after syncopal episode. Patient was seen in the ER 4/2021 after he felt weak/dizzy while standing in the store then passed out. He was found to have trop 0.02, ECG non-ischemic and was recommended to be admitted for syncope workup however signed out AMA. He was later seen at Dr. Ty's office where ECG showed sinus bradycardia 1 st degree av block TWI in anterolateral leads. Echo from 7/15/251 moderate LVH, EF 60%, grade I diastolic dysfunction, no WMA. Patient denied active chest pain, palpitations, shortness of breath, diaphoresis, fatigue, dizziness, lower extremity edema, orthopnea, positional nocturnal dyspnea, recent fever, chills, night sweats, cough, nausea, vomiting, and diarrhea. He was placed on a Holter monitor which he just finished x3 days ago and hasn't received any results yet. In light of patient's risk factors, recent syncopal episode patient is referred for cardiac catheterization with possible intervention if clinically indicated.     s/p diagnostic cath 7/27/21: LMCA normal, LCx dominant LI, RCA small non-dom with high ant take off and mild LI, LVEF 65%, LVEDP 17 mmHg, R radial. Plan for medical management.     1.	Stable for discharge as per attending Dr. Zazueta after bed rest, pt voids, wrist stable and 30 minutes of ambulation.  2.	Follow-up with Cardiologist Dr. Ty in 1-2 weeks  3.	Discharged forms signed and copies in chart

## 2021-07-30 DIAGNOSIS — E78.5 HYPERLIPIDEMIA, UNSPECIFIED: ICD-10-CM

## 2021-07-30 DIAGNOSIS — R55 SYNCOPE AND COLLAPSE: ICD-10-CM

## 2021-07-30 DIAGNOSIS — I10 ESSENTIAL (PRIMARY) HYPERTENSION: ICD-10-CM

## 2021-07-30 DIAGNOSIS — R94.39 ABNORMAL RESULT OF OTHER CARDIOVASCULAR FUNCTION STUDY: ICD-10-CM

## 2021-07-30 DIAGNOSIS — F17.200 NICOTINE DEPENDENCE, UNSPECIFIED, UNCOMPLICATED: ICD-10-CM

## 2021-08-19 RX ORDER — ATORVASTATIN CALCIUM 40 MG/1
40 TABLET, FILM COATED ORAL
Qty: 90 | Refills: 1 | Status: ACTIVE | COMMUNITY
Start: 2020-09-03 | End: 1900-01-01

## 2021-12-01 ENCOUNTER — APPOINTMENT (OUTPATIENT)
Dept: NEUROLOGY | Facility: CLINIC | Age: 66
End: 2021-12-01
